# Patient Record
Sex: MALE | Race: WHITE | NOT HISPANIC OR LATINO | Employment: FULL TIME | URBAN - METROPOLITAN AREA
[De-identification: names, ages, dates, MRNs, and addresses within clinical notes are randomized per-mention and may not be internally consistent; named-entity substitution may affect disease eponyms.]

---

## 2021-02-16 PROCEDURE — 87205 SMEAR GRAM STAIN: CPT | Performed by: FAMILY MEDICINE

## 2021-02-16 PROCEDURE — 87070 CULTURE OTHR SPECIMN AEROBIC: CPT | Performed by: FAMILY MEDICINE

## 2021-02-16 PROCEDURE — 87186 SC STD MICRODIL/AGAR DIL: CPT | Performed by: FAMILY MEDICINE

## 2021-02-16 PROCEDURE — 87147 CULTURE TYPE IMMUNOLOGIC: CPT | Performed by: FAMILY MEDICINE

## 2021-02-18 ENCOUNTER — HOSPITAL ENCOUNTER (EMERGENCY)
Facility: HOSPITAL | Age: 51
Discharge: LEFT BEFORE DISCHARGE | End: 2021-02-18
Admitting: EMERGENCY MEDICINE

## 2021-02-18 VITALS
BODY MASS INDEX: 22.22 KG/M2 | HEART RATE: 97 BPM | WEIGHT: 150 LBS | TEMPERATURE: 97.5 F | DIASTOLIC BLOOD PRESSURE: 89 MMHG | RESPIRATION RATE: 18 BRPM | HEIGHT: 69 IN | OXYGEN SATURATION: 100 % | SYSTOLIC BLOOD PRESSURE: 160 MMHG

## 2021-02-18 DIAGNOSIS — L03.032 CELLULITIS OF GREAT TOE OF LEFT FOOT: Primary | ICD-10-CM

## 2021-02-18 DIAGNOSIS — L03.032 PARONYCHIA OF GREAT TOE OF LEFT FOOT: ICD-10-CM

## 2021-02-18 PROCEDURE — 99281 EMR DPT VST MAYX REQ PHY/QHP: CPT

## 2021-02-18 NOTE — ED PROVIDER NOTES
Subjective   Patient is a 50-year-old white male with no significant medical history who presents today with complaints of pain and redness to his left great toe.  He states he has had this for the last 2 to 3 weeks.  He denies any known injury or trauma.  States he was seen in urgent care center 2 days ago had x-rays and was told that he had an infection in his toe was placed on clindamycin.  Patient states he has not been taking this medication because he states he thinks this is just an ingrown toenail.  He presents today requesting to have his toenail removed.  He denies any fever chills nausea or vomiting.  He reports no history of diabetes.          Review of Systems   Constitutional: Negative for chills and fever.   Gastrointestinal: Negative for nausea and vomiting.   Musculoskeletal:        Pain and redness left great toe   Neurological: Negative for numbness.       No past medical history on file.    Allergies   Allergen Reactions   • Penicillins Hives     As child       No past surgical history on file.    No family history on file.    Social History     Socioeconomic History   • Marital status:      Spouse name: Not on file   • Number of children: Not on file   • Years of education: Not on file   • Highest education level: Not on file   Tobacco Use   • Smoking status: Current Every Day Smoker     Packs/day: 0.50     Types: Cigarettes   • Smokeless tobacco: Never Used   Substance and Sexual Activity   • Alcohol use: Never     Frequency: Never           Objective   Physical Exam  Vital signs and triage nurse note reviewed.  Constitutional: Awake, alert; well-developed and well-nourished. No acute distress is noted.  Cardiovascular: Regular rate and rhythm  Pulmonary: Respiratory effort regular nonlabored  Musculoskeletal: Independent range of motion of all extremities.  There is diffuse redness throughout the left great toe.  There is also diffuse tenderness throughout the toe greatest over the  "medial and lateral aspects.  There does appear to be some purulent material around the nail medially.  There is no streaking.  There is mild edema.  There are no open wounds.  There is good cap refill and sensation distally.  Neuro: Alert oriented x3, speech is clear and appropriate, GCS 15.    Skin: Flesh tone, warm, dry, intact; no erythematous or petechial rash or lesion.      Procedures           ED Course                                           MDM  Number of Diagnoses or Management Options  Diagnosis management comments: Comorbidities: None  Differentials: Paronychia, ingrown nail, cellulitis, osteomyelitis;this list is not all inclusive and does not constitute the entirety of considered causes  Discussion with provider:  Radiology interpretation: X-rays reviewed by me and interpreted by radiologist:   Lab interpretation: Labs viewed by me significant for:     Discussion was held with the patient that labs and x-rays were to be ordered and that he would be receiving IM antibiotics.  Patient became upset and states \"you are not going to take off my nail today?\"  Patient was informed that this would not be done today but that we will check his labs, x-ray and give IM antibiotics and that he would be instructed to take the antibiotics that he was given 2 days ago by the urgent care center.  States he could follow-up with podiatry to have his toenail removed if so desired.  Patient became upset states that he did not want anything else done today and he proceeded to walk out of the department without any intervention.    Patient Progress  Patient progress: stable      Final diagnoses:   Cellulitis of great toe of left foot   Paronychia of great toe of left foot            Deidre Archer, APRN  02/18/21 1000    "